# Patient Record
Sex: MALE | Race: BLACK OR AFRICAN AMERICAN | NOT HISPANIC OR LATINO | URBAN - METROPOLITAN AREA
[De-identification: names, ages, dates, MRNs, and addresses within clinical notes are randomized per-mention and may not be internally consistent; named-entity substitution may affect disease eponyms.]

---

## 2018-05-21 ENCOUNTER — EMERGENCY (EMERGENCY)
Facility: HOSPITAL | Age: 68
LOS: 1 days | Discharge: HOME | End: 2018-05-21
Admitting: EMERGENCY MEDICINE
Payer: OTHER GOVERNMENT

## 2018-05-21 DIAGNOSIS — R41.82 ALTERED MENTAL STATUS, UNSPECIFIED: ICD-10-CM

## 2018-05-21 DIAGNOSIS — F10.120 ALCOHOL ABUSE WITH INTOXICATION, UNCOMPLICATED: ICD-10-CM

## 2018-05-21 PROCEDURE — 99283 EMERGENCY DEPT VISIT LOW MDM: CPT

## 2018-05-22 VITALS
TEMPERATURE: 98 F | HEART RATE: 95 BPM | SYSTOLIC BLOOD PRESSURE: 137 MMHG | OXYGEN SATURATION: 98 % | DIASTOLIC BLOOD PRESSURE: 87 MMHG | RESPIRATION RATE: 16 BRPM

## 2018-05-22 NOTE — ED PROVIDER NOTE - OBJECTIVE STATEMENT
68 year old male brought in for ETOH intox from [facility].  Placed in stretcher and quickly falls asleep.  Unable to cooperate with remainder of history.

## 2023-03-14 NOTE — ED ADULT NURSE NOTE - PMH
Patient informed. Appt changed back to Dr. Bravo [facility] - virtual visit. Patient will contact [facility] to have labs drawn (Dr. Lisa Gomez and Dr. Carla Lay orders). [facility] message sent including lab orders so patient makes sure everything gets drawn.      Future Appointments   Date Time Provider Cm [place]   3/22/2023 11:30 AM Sadiq Malik MD [facility] [place] [facility] [place] No pertinent past medical history